# Patient Record
Sex: FEMALE | Race: WHITE | NOT HISPANIC OR LATINO | URBAN - METROPOLITAN AREA
[De-identification: names, ages, dates, MRNs, and addresses within clinical notes are randomized per-mention and may not be internally consistent; named-entity substitution may affect disease eponyms.]

---

## 2023-06-03 ENCOUNTER — EMERGENCY (EMERGENCY)
Facility: HOSPITAL | Age: 23
LOS: 1 days | Discharge: HOME | End: 2023-06-03
Attending: EMERGENCY MEDICINE | Admitting: EMERGENCY MEDICINE
Payer: COMMERCIAL

## 2023-06-03 VITALS
HEART RATE: 70 BPM | RESPIRATION RATE: 18 BRPM | TEMPERATURE: 98 F | SYSTOLIC BLOOD PRESSURE: 107 MMHG | DIASTOLIC BLOOD PRESSURE: 65 MMHG | OXYGEN SATURATION: 100 %

## 2023-06-03 PROCEDURE — 93971 EXTREMITY STUDY: CPT | Mod: 26,RT

## 2023-06-03 PROCEDURE — 73060 X-RAY EXAM OF HUMERUS: CPT | Mod: 26,RT

## 2023-06-03 PROCEDURE — 99284 EMERGENCY DEPT VISIT MOD MDM: CPT

## 2023-06-03 PROCEDURE — 73080 X-RAY EXAM OF ELBOW: CPT | Mod: 26,RT

## 2023-06-03 PROCEDURE — 73090 X-RAY EXAM OF FOREARM: CPT | Mod: 26,RT

## 2023-06-03 NOTE — ED PROVIDER NOTE - PATIENT PORTAL LINK FT
You can access the FollowMyHealth Patient Portal offered by [facility] by registering at the following website: [email or web address]. By joining [employer]’s FollowMyHealth portal, you will also be able to view your health information using other applications (apps) compatible with our system.

## 2023-06-03 NOTE — ED ADULT TRIAGE NOTE - CHIEF COMPLAINT QUOTE
Pt arrives ambulatory to triage c/o swelling to right bicep since last Thursday. Unknown injury. Pt currently being evaluated by a rheumatologist for bilat numbness/tingling to arms and thighs. Denies other PMHx.

## 2023-06-03 NOTE — ED PROVIDER NOTE - PROGRESS NOTE DETAILS
Patient stable x-rays negative pending ultrasound to be done.  We will sign out to night team at end of shift us neg for dvt, mother updated, pt in transit from us

## 2023-06-03 NOTE — ED PROVIDER NOTE - MUSCULOSKELETAL, MLM
RUE: scant swelling with no erythema over distal R-biceps. No TTP elicited. Active ROM intact in elbow, wrist, shoulder. Radial pulse intact. Sensation intact in distal extremityi. LUE: active ROM intact wrist, elbow, shoulder. Radial pulse intact. Sensation intact to light touch.

## 2023-06-03 NOTE — ED PROVIDER NOTE - ATTENDING APP SHARED VISIT CONTRIBUTION OF CARE
Attending Statement: I have reviewed and agree with all pertinent clinical information, including history and physical exam and agree with treatment plan of the PA, except as noted.  23-year-old female with no significant past medical history presents with right arm pain x2 days.  No history of trauma.  States pain started in the right humerus and now is painful along the forearm.  Patient states it feels swollen compared to left side.  Has no chest pain no shortness of breath no trauma and no travel.  Of note patient has been evaluated by rheumatology for possible hilton syndrome.  Vital signs noted not tachycardic not tachypneic well-appearing female.  No respiratory distress ANO x3.  No appreciable swelling of the right arm compared to the left.  Normal range of motion of the right shoulder humerus and wrist.  No erythema of the right arm specially at the joints.  Normal  normal sensation no rashes appreciated.  Plan x-rays of the right arm, ultrasound of the right arm rule out DVT, pain meds as needed

## 2023-06-03 NOTE — ED PROVIDER NOTE - OBJECTIVE STATEMENT
23F with no PMH p/w CC arm pain. Patient reports developing numbness in the R-forearm on Thursday after waking up followed by subjective swelling and tenderness to the distal R-biceps and proximal forearm. She works as a dance teacher but denies having any trauma or heavy lifting or any inciting event. She reports b/l hand numbness and changes in color/temperature and was seen previously by Rheumatology and reports being diagnosed with Raynaud's syndrome but further workup is still in progress. Patient denies F/C, joint pain, rash, CP, SOB, abd pain, n/v/d.